# Patient Record
Sex: MALE | Race: WHITE | NOT HISPANIC OR LATINO | URBAN - METROPOLITAN AREA
[De-identification: names, ages, dates, MRNs, and addresses within clinical notes are randomized per-mention and may not be internally consistent; named-entity substitution may affect disease eponyms.]

---

## 2017-02-20 ENCOUNTER — HOSPITAL ENCOUNTER (OUTPATIENT)
Dept: PREOP | Facility: HOSPITAL | Age: 52
Setting detail: HOSPITAL OUTPATIENT SURGERY
Discharge: HOME | End: 2017-02-20
Attending: INTERNAL MEDICINE | Admitting: INTERNAL MEDICINE

## 2017-02-20 ENCOUNTER — ON CAMPUS - OUTPATIENT (OUTPATIENT)
Dept: URBAN - METROPOLITAN AREA HOSPITAL 85 | Facility: HOSPITAL | Age: 52
End: 2017-02-20
Payer: COMMERCIAL

## 2017-02-20 DIAGNOSIS — K64.9 UNSPECIFIED HEMORRHOIDS: ICD-10-CM

## 2017-02-20 DIAGNOSIS — K57.30 DIVERTICULOSIS OF LARGE INTESTINE WITHOUT PERFORATION OR ABS: ICD-10-CM

## 2017-02-20 DIAGNOSIS — Z12.11 ENCOUNTER FOR SCREENING FOR MALIGNANT NEOPLASM OF COLON: ICD-10-CM

## 2017-02-20 PROCEDURE — 45378 DIAGNOSTIC COLONOSCOPY: CPT | Performed by: INTERNAL MEDICINE

## 2022-06-16 ENCOUNTER — OFFICE VISIT (OUTPATIENT)
Dept: CARDIOLOGY | Facility: CLINIC | Age: 57
End: 2022-06-16

## 2022-06-16 VITALS
WEIGHT: 169 LBS | SYSTOLIC BLOOD PRESSURE: 142 MMHG | HEART RATE: 83 BPM | DIASTOLIC BLOOD PRESSURE: 93 MMHG | OXYGEN SATURATION: 98 % | BODY MASS INDEX: 26.53 KG/M2 | HEIGHT: 67 IN

## 2022-06-16 DIAGNOSIS — I10 ESSENTIAL HYPERTENSION: ICD-10-CM

## 2022-06-16 DIAGNOSIS — R07.9 RECURRENT CHEST PAIN: Primary | ICD-10-CM

## 2022-06-16 DIAGNOSIS — R06.09 DYSPNEA ON EXERTION: ICD-10-CM

## 2022-06-16 DIAGNOSIS — E78.5 DYSLIPIDEMIA: ICD-10-CM

## 2022-06-16 PROCEDURE — 99204 OFFICE O/P NEW MOD 45 MIN: CPT | Performed by: INTERNAL MEDICINE

## 2022-06-16 PROCEDURE — 93000 ELECTROCARDIOGRAM COMPLETE: CPT | Performed by: INTERNAL MEDICINE

## 2022-06-16 RX ORDER — ERGOCALCIFEROL 1.25 MG/1
50000 CAPSULE ORAL WEEKLY
COMMUNITY

## 2022-06-16 RX ORDER — LOSARTAN POTASSIUM 100 MG/1
100 TABLET ORAL DAILY
COMMUNITY
Start: 2022-06-11

## 2022-06-16 RX ORDER — METOPROLOL SUCCINATE 25 MG/1
25 TABLET, EXTENDED RELEASE ORAL DAILY
Qty: 90 TABLET | Refills: 3 | Status: SHIPPED | OUTPATIENT
Start: 2022-06-16

## 2022-06-16 RX ORDER — ASPIRIN 81 MG/1
81 TABLET ORAL DAILY
Qty: 90 TABLET | Refills: 3 | Status: SHIPPED | OUTPATIENT
Start: 2022-06-16

## 2022-06-16 RX ORDER — ATORVASTATIN CALCIUM 20 MG/1
TABLET, FILM COATED ORAL
COMMUNITY
Start: 2022-04-07

## 2022-06-16 NOTE — PROGRESS NOTES
Cardiology Consult Note    Patient Identification:  Name: Dusty Walker  Age: 56 y.o.  Sex: male  :  1965  MRN: [id number]             Requesting Physician :  Clement Bowman MD      Reason for Consultation / Chief Complaint : Chest pain    History of Present Illness:        Mr. Dusty Walker has PMH of    Hypertension  Dyslipidemia  Rotator cuff surgery  Former smoker  NKDA, family history negative for premature CAD,    Here for evaluation of chest pain.  Patient has been noticing substernal chest pain for last few months started in April.  No aggravating or relieving factors lasts almost all day.  Has shortness of breath.  Denies any dizziness loss of consciousness.      Patient's arterial blood pressure is 143/93, heart rate 83 bpm, O2 sat of 98% on room air.      Assessment:  :    Recurrent chest pain  Dyspnea on exertion  Former smoker  Hypertension  Dyslipidemia      Recommendations / Plan:        Reviewed EKG results with patient.  We will start him on aspirin and beta-blockers and continue statin.  We will schedule for stress Cardiolite.  Risk benefits alternatives explained.  We will follow-up after stress test and consider further evaluation treatment.           Diagnosis Plan   1. Recurrent chest pain  Stress Test With Myocardial Perfusion One Day   2. Dyspnea on exertion  Stress Test With Myocardial Perfusion One Day   3. Essential hypertension  Stress Test With Myocardial Perfusion One Day   4. Dyslipidemia  Stress Test With Myocardial Perfusion One Day              Past Medical History:  Past Medical History:   Diagnosis Date   • Hyperlipidemia    • Hypertension    • Vitamin D deficiency      Past Surgical History:  Past Surgical History:   Procedure Laterality Date   • SHOULDER SURGERY        Allergies:  No Known Allergies  Home Meds:  Current Meds:     Current Outpatient Medications:   •  atorvastatin (LIPITOR) 20 MG tablet, , Disp: , Rfl:   •  losartan (COZAAR) 100 MG  "tablet, Take 100 mg by mouth Daily., Disp: , Rfl:   •  vitamin D (ERGOCALCIFEROL) 1.25 MG (22309 UT) capsule capsule, Take 50,000 Units by mouth 1 (One) Time Per Week., Disp: , Rfl:   •  aspirin (aspirin) 81 MG EC tablet, Take 1 tablet by mouth Daily., Disp: 90 tablet, Rfl: 3  •  metoprolol succinate XL (TOPROL-XL) 25 MG 24 hr tablet, Take 1 tablet by mouth Daily., Disp: 90 tablet, Rfl: 3  Social History:   Social History     Tobacco Use   • Smoking status: Former Smoker     Years: 5.00     Types: Cigarettes   • Smokeless tobacco: Never Used   Substance Use Topics   • Alcohol use: Yes     Alcohol/week: 35.0 standard drinks     Types: 35 Cans of beer per week      Family History:  Family History   Problem Relation Age of Onset   • No Known Problems Mother    • No Known Problems Father         Review of Systems : Review of Systems   Constitutional: Negative for fever and malaise/fatigue.   HENT: Negative for congestion and hearing loss.    Eyes: Positive for visual disturbance. Negative for double vision.   Cardiovascular: Positive for chest pain and palpitations. Negative for claudication, dyspnea on exertion, leg swelling and syncope.   Respiratory: Positive for shortness of breath. Negative for cough.    Endocrine: Negative for cold intolerance.   Skin: Negative for color change and rash.   Musculoskeletal: Negative for arthritis and joint pain.   Gastrointestinal: Negative for abdominal pain and heartburn.   Genitourinary: Negative for hematuria.   Neurological: Negative for excessive daytime sleepiness and dizziness.   Psychiatric/Behavioral: Negative for depression. The patient is nervous/anxious.    All other systems reviewed and are negative.            Constitutional:  Heart Rate:  [83] 83  BP: (142)/(93) 142/93    Physical Exam   /93   Pulse 83   Ht 170.2 cm (67\")   Wt 76.7 kg (169 lb)   SpO2 98%   BMI 26.47 kg/m²   Physical Exam  General:  Appears in no acute distress  Eyes: Sclera is anicteric,  " conjunctiva is clear   HEENT:  No JVD. Thyroid not visibly enlarged. No mucosal pallor or cyanosis  Respiratory: Respirations regular and unlabored at rest.  Bilaterally good breath sounds, with good air entry in all fields. No crackles, rubs or wheezes auscultated  Cardiovascular: S1,S2 Regular rate and rhythm. No murmur, rub or gallop auscultated. No pretibial pitting edema  Gastrointestinal: Abdomen soft, flat, non tender. Bowel sounds present.   Musculoskeletal:  No abnormal movements  Extremities: No digital clubbing or cyanosis  Skin: Color pink. Skin warm and dry to touch. No rashes  No xanthoma  Neuro: Alert and awake, no lateralizing deficits appreciated    Cardiographics  ECG: EKG tracing was  personally reviewed/interpreted by me    ECG 12 Lead    Date/Time: 6/16/2022 2:19 PM  Performed by: Brice Beard MD  Authorized by: Brice Beard MD   Comparison: compared with previous ECG from 7/2/2018  Comparison to previous ECG: EKG done today reviewed/interpreted by me reveals sinus rhythm with rate of 79 bpm, no new change compared EKG from 7-18              Imaging  Chest X-ray:   Imaging Results (Last 24 Hours)     ** No results found for the last 24 hours. **          Lab Review: I have reviewed the labs              @[email or web address]@                  Brice Beard MD  6/16/2022, 14:20 EDT      EMR Dragon/Transcription:   Dictated utilizing Dragon dictation

## 2022-06-30 ENCOUNTER — APPOINTMENT (OUTPATIENT)
Dept: CARDIOLOGY | Facility: HOSPITAL | Age: 57
End: 2022-06-30

## 2022-07-08 ENCOUNTER — APPOINTMENT (OUTPATIENT)
Dept: CARDIOLOGY | Facility: HOSPITAL | Age: 57
End: 2022-07-08

## 2024-02-27 ENCOUNTER — OFFICE VISIT (OUTPATIENT)
Dept: CARDIOLOGY | Facility: CLINIC | Age: 59
End: 2024-02-27
Payer: COMMERCIAL

## 2024-02-27 VITALS
BODY MASS INDEX: 28.25 KG/M2 | HEIGHT: 67 IN | WEIGHT: 180 LBS | DIASTOLIC BLOOD PRESSURE: 88 MMHG | SYSTOLIC BLOOD PRESSURE: 145 MMHG | HEART RATE: 79 BPM | OXYGEN SATURATION: 100 %

## 2024-02-27 DIAGNOSIS — E78.2 MIXED DYSLIPIDEMIA: ICD-10-CM

## 2024-02-27 DIAGNOSIS — I10 ESSENTIAL HYPERTENSION: Primary | ICD-10-CM

## 2024-02-27 DIAGNOSIS — R60.0 BILATERAL LEG EDEMA: ICD-10-CM

## 2024-02-27 PROCEDURE — 99214 OFFICE O/P EST MOD 30 MIN: CPT | Performed by: NURSE PRACTITIONER

## 2024-02-27 PROCEDURE — 93000 ELECTROCARDIOGRAM COMPLETE: CPT | Performed by: NURSE PRACTITIONER

## 2024-02-27 RX ORDER — LOSARTAN POTASSIUM AND HYDROCHLOROTHIAZIDE 25; 100 MG/1; MG/1
1 TABLET ORAL DAILY
COMMUNITY

## 2024-02-27 RX ORDER — METOPROLOL SUCCINATE 25 MG/1
25 TABLET, EXTENDED RELEASE ORAL DAILY
Qty: 30 TABLET | Refills: 5 | Status: SHIPPED | OUTPATIENT
Start: 2024-02-27

## 2024-02-27 RX ORDER — CLONIDINE HYDROCHLORIDE 0.1 MG/1
1 TABLET ORAL DAILY
COMMUNITY

## 2024-02-27 RX ORDER — SIMVASTATIN 20 MG
20 TABLET ORAL NIGHTLY
COMMUNITY

## 2024-02-27 NOTE — PROGRESS NOTES
[facility] CARDIOLOGY      REASON FOR FOLLOW-UP:  Establish care  Hypertension          Chief Complaint   Patient presents with    Hypertension         Dear Clement Bowman MD        Hypertension       It was my pleasure to see Dusty Walker in the office today.  He is a 58-year-old male with no known history of ischemic heart disease and no family history of premature coronary disease.  Past medical history includes hypertension, dyslipidemia.  The patient was evaluated by cardiologist Dr. Beard in 2022 for chest pain.  He was started on aspirin, beta-blocker.  Statin was continued.  Myocardial perfusion study was ordered but it appears that was not completed.  Stress testing was performed July 2018 in which the patient exercised for 9 minutes 26 seconds via [place] protocol with target heart rate achieved and appropriate blood pressure elevation.  No evidence of provokable ischemia with EF 67%.    Mr. Walker is referred to our office today for further evaluation of hypertension.  The patient states he does not monitor his pressures routinely morning and night.  He has noticed fluctuations with highs in the 150s/160s systolic.  His blood pressure yesterday at home was 152/96.  1 week ago 128/72.  He has been taking Hyzaar in the evening and noted frequent nighttime urination.  He denies any complaints of chest pain, pressure, tightness.  He denies any shortness of breath at rest, dyspnea with exertion, orthopnea or PND.  He does report occasional mild lower extremity edema as well as some pain in his feet.  He has had some recent abdominal discomfort described as burning which he has reported to his primary care.  He denies any dizziness, lightheadedness, near syncopal or syncopal episodes.  EKG in the office today shows normal sinus rhythm with some nonspecific ST changes that are unchanged from previous.        Labs reviewed from 9/7/2023: A1c 5.6, BMP/CBC unremarkable.   Lipids with triglycerides 180, , thyroid studies within normal limits.      ASSESSMENT:  Primary hypertension-labile  Dyslipidemia  Lower extremity edema    PLAN:  Continue Hyzaar-take in the morning  Hold clonidine  Start metoprolol succinate 25 mg daily  Continue monitoring a.m./p.m. blood pressures and bring log to follow-up  Check 2D echocardiogram      Diagnoses and all orders for this visit:    1. Essential hypertension (Primary)  -     Adult Transthoracic Echo Complete w/ Color, Spectral and Contrast if necessary per protocol; Future    2. Bilateral leg edema  -     Adult Transthoracic Echo Complete w/ Color, Spectral and Contrast if necessary per protocol; Future    Other orders  -     metoprolol succinate XL (TOPROL-XL) 25 MG 24 hr tablet; Take 1 tablet by mouth Daily.  Dispense: 30 tablet; Refill: 5          The following portions of the patient's history were reviewed and updated as appropriate: allergies, current medications, past family history, past medical history, past social history, past surgical history, and problem list.    REVIEW OF SYSTEMS:    Review of Systems   Cardiovascular:  Positive for leg swelling.   All other systems reviewed and are negative.      Vitals:    02/27/24 0806   BP: 145/88   Pulse: 79   SpO2: 100%         PHYSICAL EXAM:    General: Alert, cooperative, no distress, appears stated age  Head:  Normocephalic, atraumatic, mucous membranes moist  Eyes:  Conjunctiva/corneas clear, EOM's intact     Neck:  Supple,  no JVD or bruit     Lungs: Clear to auscultation bilaterally, no wheezes rhonchi rales are noted  Chest wall: No tenderness  Musculoskeletal:   Ambulates freely without assistance  Heart::  Regular rate and rhythm, S1 and S2 normal, no murmur, rub or gallop  Abdomen: Soft, non-tender, nondistended, bowel sounds active, no abdominal bruit  Extremities: No cyanosis, clubbing, or edema   Pulses: 2+ and symmetric all extremities  Skin:  No rashes or  "lesions  Neuro/psych: A&O x3. CN II through XII are grossly intact with appropriate affect        Past Medical History:   Diagnosis Date    Hyperlipidemia     Hypertension     Vitamin D deficiency        Past Surgical History:   Procedure Laterality Date    SHOULDER SURGERY           Current Outpatient Medications:     cloNIDine (CATAPRES) 0.1 MG tablet, Take 1 tablet by mouth Daily., Disp: , Rfl:     losartan-hydrochlorothiazide (HYZAAR) 100-25 MG per tablet, Take 1 tablet by mouth Daily., Disp: , Rfl:     metoprolol succinate XL (TOPROL-XL) 25 MG 24 hr tablet, Take 1 tablet by mouth Daily., Disp: 30 tablet, Rfl: 5    simvastatin (ZOCOR) 20 MG tablet, Take 1 tablet by mouth Every Night., Disp: , Rfl:     vitamin D (ERGOCALCIFEROL) 1.25 MG (11815 UT) capsule capsule, Take 1 capsule by mouth 1 (One) Time Per Week., Disp: , Rfl:     aspirin (aspirin) 81 MG EC tablet, Take 1 tablet by mouth Daily., Disp: 90 tablet, Rfl: 3    atorvastatin (LIPITOR) 20 MG tablet, , Disp: , Rfl:     losartan (COZAAR) 100 MG tablet, Take 1 tablet by mouth Daily., Disp: , Rfl:     No Known Allergies    Family History   Problem Relation Age of Onset    No Known Problems Mother     No Known Problems Father        Social History     Tobacco Use    Smoking status: Former     Years: 5     Types: Cigarettes    Smokeless tobacco: Never   Substance Use Topics    Alcohol use: Yes     Alcohol/week: 35.0 standard drinks of alcohol     Types: 35 Cans of beer per week           Current Electrocardiogram:    ECG 12 Lead    Date/Time: 2/27/2024 8:35 AM  Performed by: Stephanie Zee APRN    Authorized by: Stephanie Zee APRN  Comparison: compared with previous ECG from 6/6/2022  Similar to previous ECG  Rhythm: sinus rhythm  BPM: 79  Other findings: non-specific ST-T wave changes              EMR Dragon/Transcription:   \"Dictated utilizing Dragon dictation\".     Copied text in this note has been reviewed by me and is accurate as of " 02/27/24.

## 2024-02-27 NOTE — PATIENT INSTRUCTIONS
Stop Clonidine    Continue Losartan/HCTZ    Start Metoprolol Succinate 25mg once daily    Write down blood pressures morning and night

## 2024-05-01 ENCOUNTER — TELEPHONE (OUTPATIENT)
Dept: CARDIOLOGY | Facility: CLINIC | Age: 59
End: 2024-05-01

## 2024-05-01 NOTE — TELEPHONE ENCOUNTER
Caller: Dusty Walker    Relationship: Self    Best call back number:  [phone]    PATIENT CALLED IN TO STATE THAT INS NEEDS MORE INFO IN ORDER TO APPROVE ECHO    [phone]

## 2024-05-15 ENCOUNTER — DOCUMENTATION (OUTPATIENT)
Dept: CARDIOLOGY | Facility: CLINIC | Age: 59
End: 2024-05-15
Payer: COMMERCIAL

## 2024-05-15 NOTE — PROGRESS NOTES
"Called for [id number] to review case for 2D echocardiogram.  Waited on hold several minutes for physician who said case must be reviewed by cardiologist.  Waited on hold again for cardiologist to answer.  Waited on hold several minutes for cardiologist to review office notes.  Cardiologist then stated that no edema was noted on physical exam and \"EKG is normal\".  I stated \"EKG shows non-specific...\".  I was interrupted by physician stating, \"No, it's normal.  Thanks for the call, Juliana.  Take care now\".  Physician hung up.  "

## 2024-05-23 ENCOUNTER — OFFICE VISIT (OUTPATIENT)
Dept: CARDIOLOGY | Facility: CLINIC | Age: 59
End: 2024-05-23
Payer: COMMERCIAL

## 2024-05-23 VITALS
WEIGHT: 175 LBS | HEIGHT: 67 IN | DIASTOLIC BLOOD PRESSURE: 93 MMHG | OXYGEN SATURATION: 99 % | BODY MASS INDEX: 27.47 KG/M2 | SYSTOLIC BLOOD PRESSURE: 170 MMHG | HEART RATE: 79 BPM

## 2024-05-23 DIAGNOSIS — R07.9 CHEST PAIN IN ADULT: Primary | ICD-10-CM

## 2024-05-23 DIAGNOSIS — R60.0 BILATERAL LEG EDEMA: ICD-10-CM

## 2024-05-23 DIAGNOSIS — E78.2 MIXED DYSLIPIDEMIA: ICD-10-CM

## 2024-05-23 DIAGNOSIS — R06.09 DYSPNEA ON EXERTION: ICD-10-CM

## 2024-05-23 DIAGNOSIS — I10 ESSENTIAL (PRIMARY) HYPERTENSION: ICD-10-CM

## 2024-05-23 PROCEDURE — 99214 OFFICE O/P EST MOD 30 MIN: CPT | Performed by: NURSE PRACTITIONER

## 2024-05-23 RX ORDER — MONTELUKAST SODIUM 10 MG/1
1 TABLET ORAL DAILY
COMMUNITY
Start: 2024-05-16

## 2024-05-23 RX ORDER — ALLOPURINOL 300 MG/1
300 TABLET ORAL DAILY
COMMUNITY

## 2024-05-23 NOTE — PROGRESS NOTES
[facility] CARDIOLOGY      REASON FOR FOLLOW-UP:  Chest pain  Dyspnea on exertion          Chief Complaint   Patient presents with    Heart Problem     SOA. Pain left neck and chest.         Dear Colby, Clement Bryant MD        History of Present Illness   It was my pleasure to see Dusty Walker in the office today.  He is a 58-year-old male with no known history of ischemic heart disease and no family history of premature coronary disease.  Past medical history includes hypertension, dyslipidemia.  The patient was evaluated by cardiologist Dr. Beard in 2022 for chest pain.  He was started on aspirin, beta-blocker.  Statin was continued.  Myocardial perfusion study was ordered but it appears that was not completed.  Stress testing was performed July 2018 in which the patient exercised for 9 minutes 26 seconds via [place] protocol with target heart rate achieved and appropriate blood pressure elevation.  No evidence of provokable ischemia with EF 67%.  2D echocardiogram was ordered but unable to be completed.  He presents today in follow-up for the above diagnoses.    Today, the patient tells me that he has been experiencing pain in his left chest and left neck area he describes it as a sharp pain, moderate in intensity that can occur with or without activity.  He reports shortness of breath with any activity.  His chest discomfort is not necessarily associated with shortness of breath.  He denies any dizziness, lightheadedness, near syncopal or syncopal episodes.  No diaphoresis, nausea or vomiting.  He presents today a blood pressure log that shows elevated pressures in the morning just prior to taking his antihypertensive.  Other blood pressures are within good readings.  He has had intermittent mild lower extremity edema.      ASSESSMENT:  Chest discomfort  Dyspnea on exertion  Primary hypertension  Dyslipidemia  Lower extremity edema    PLAN:  Patient should hold clonidine.   Continue Hyzaar in the morning, take Toprol in the evening.  I will resend order for 2D echocardiogram to evaluate for any LV or valvular dysfunction contributing to his chest discomfort.  He did have exercise nuclear stress test in 2022 with good functional capacity and no ECG changes during exercise.  No evidence of inducible ischemia.  Follow-up after testing        CHF Guideline Directed Medical Therapy  Beta Blocker:   ARNI/ACE/ARB:   SGLT 2 inhibitors:   Diuretics:   Aldosterone Antagonist:   Vasodilators & Nitrates:       Diagnoses and all orders for this visit:    1. Chest pain in adult (Primary)    2. Dyspnea on exertion    3. Essential (primary) hypertension    4. Mixed dyslipidemia    5. Bilateral leg edema          The following portions of the patient's history were reviewed and updated as appropriate: allergies, current medications, past family history, past medical history, past social history, past surgical history, and problem list.    REVIEW OF SYSTEMS:    Review of Systems   Cardiovascular:  Positive for chest pain, dyspnea on exertion and leg swelling.   Respiratory:  Positive for shortness of breath.    All other systems reviewed and are negative.      Vitals:    05/23/24 1007   BP: 170/93   Pulse: 79   SpO2: 99%         PHYSICAL EXAM:    General: Alert, cooperative, no distress, appears stated age  Head:  Normocephalic, atraumatic, mucous membranes moist  Eyes:  Conjunctiva/corneas clear, EOM's intact     Neck:  Supple,  no JVD or bruit     Lungs: Clear to auscultation bilaterally, no wheezes rhonchi rales are noted  Chest wall: No tenderness  Musculoskeletal:   Ambulates freely without assistance  Heart::  Regular rate and rhythm, S1 and S2 normal, no murmur, rub or gallop  Abdomen: Soft, non-tender, nondistended, bowel sounds active, no abdominal bruit  Extremities: No cyanosis, clubbing, or edema   Pulses: 2+ and symmetric all extremities  Skin:  No rashes or lesions  Neuro/psych: A&O x3. CN  "II through XII are grossly intact with appropriate affect        Past Medical History:   Diagnosis Date    Hyperlipidemia     Hypertension     Vitamin D deficiency        Past Surgical History:   Procedure Laterality Date    SHOULDER SURGERY           Current Outpatient Medications:     allopurinol (ZYLOPRIM) 300 MG tablet, Take 1 tablet by mouth Daily., Disp: , Rfl:     losartan-hydrochlorothiazide (HYZAAR) 100-25 MG per tablet, Take 1 tablet by mouth Daily., Disp: , Rfl:     metoprolol succinate XL (TOPROL-XL) 25 MG 24 hr tablet, Take 1 tablet by mouth Daily., Disp: 30 tablet, Rfl: 5    montelukast (SINGULAIR) 10 MG tablet, Take 1 tablet by mouth Daily., Disp: , Rfl:     simvastatin (ZOCOR) 20 MG tablet, Take 1 tablet by mouth Every Night., Disp: , Rfl:     vitamin D (ERGOCALCIFEROL) 1.25 MG (16704 UT) capsule capsule, Take 1 capsule by mouth 1 (One) Time Per Week., Disp: , Rfl:     aspirin (aspirin) 81 MG EC tablet, Take 1 tablet by mouth Daily. (Patient not taking: Reported on 5/23/2024), Disp: 90 tablet, Rfl: 3    cloNIDine (CATAPRES) 0.1 MG tablet, Take 1 tablet by mouth Daily. (Patient not taking: Reported on 5/23/2024), Disp: , Rfl:     No Known Allergies    Family History   Problem Relation Age of Onset    No Known Problems Mother     No Known Problems Father        Social History     Tobacco Use    Smoking status: Former     Types: Cigarettes    Smokeless tobacco: Never   Substance Use Topics    Alcohol use: Yes     Alcohol/week: 35.0 standard drinks of alcohol     Types: 35 Cans of beer per week           Current Electrocardiogram:  Procedures        EMR Dragon/Transcription:   \"Dictated utilizing Dragon dictation\".     Copied text in this note has been reviewed by me and is accurate as of 05/24/24.    "

## 2025-04-08 NOTE — PROGRESS NOTES
"Chief Complaint  Establish Care, Hypertension, and Diabetes    Subjective          Dusty Walker presents to [facility] for     HPI  Establish care, former patient of Clement Bowman. Patient is a native Vietnamese speaker and HPI was obtained with the help of a professional .    Hypertension  Patient does check blood pressure at home.   Home readings range from  120's/70's to 190's/90's per patient/patient submitted blood pressure diary.  He says that more often than not his systolic is 150s to 160s when he check it in the morning.  However, this is at 7 am before he takes his antihypertensives.  Following taking his blood pressure medications, a few hours later, his systolic is significantly lower and typically in the 120s-130s.  Patient denies chest pain, blurred vision, dyspnea, headache, and palpitations.   Patient reports they are taking medications as prescribed and they are not having side effects.  Reports that his blood pressure runs higher in the AM than in the evening.     Patient states that he used to take simvastatin but that his cholesterol is \"good\" and he \"doesn't need it anymore.\"    Lower abdominal pain present for atleast 1 year.   He points to the suprapubic region.  Has hx of BPH, takes Flomax.  Mostly hurts in the mornings.  Feels he is producing less ejaculate when he orgasms over the past year.  He says this has been evaluated several times previously.  He states that he saw a urologist, had abdominal ultrasound, was scheduled for cystoscopy but never proceeded with this.  Has issues with voiding, hesitancy, dribbling, weakened urinary stream. Does not believe that Flomax has helped.    Most recent available labs from 9/2023:  Uric acid 5.7  A1c 5.6 (patient shows more recent A1c of 6.1)  CMP unremarkable  Lipid panel - total cholesterol 172, hdl 39, , ldl 103  B12 367  TSH, fT3, fT4 WNL  PSA 0.56  Vitamin D 72  CBC " "unremarkable    Objective   Vital Signs:   /82 (BP Location: Right arm, Patient Position: Sitting, Cuff Size: Adult)   Pulse 84   Temp 98.4 °F (36.9 °C) (Temporal)   Resp 18   Ht 171.5 cm (67.5\")   Wt 81.8 kg (180 lb 6 oz)   SpO2 99% Comment: room air  BMI 27.83 kg/m²     Physical Exam  Vitals and nursing note reviewed.   Constitutional:       General: He is not in acute distress.     Appearance: Normal appearance. He is not ill-appearing or diaphoretic.   HENT:      Head: Normocephalic and atraumatic.      Nose: No congestion or rhinorrhea.   Eyes:      Extraocular Movements: Extraocular movements intact.      Pupils: Pupils are equal, round, and reactive to light.   Cardiovascular:      Rate and Rhythm: Normal rate and regular rhythm.      Pulses: Normal pulses.   Pulmonary:      Effort: Pulmonary effort is normal.      Breath sounds: Normal breath sounds.   Abdominal:      Tenderness:  in the right upper quadrant and suprapubic area There is no guarding or rebound. Positive signs include Aguiar's sign. Negative signs include Rovsing's sign and McBurney's sign.   Musculoskeletal:         General: Normal range of motion.      Cervical back: Normal range of motion.   Skin:     General: Skin is warm and dry.   Neurological:      Mental Status: He is alert and oriented to person, place, and time.   Psychiatric:         Mood and Affect: Mood normal.         Behavior: Behavior normal.        Result Review :                 Assessment and Plan    Diagnoses and all orders for this visit:    1. Primary hypertension (Primary)  Assessment & Plan:  Patient's current regimen includes losartan, hydrochlorothiazide, and spironolactone at a relatively high dose.  Discussed possibility of initiating a secondary hypertension evaluation.  Patient states that he is already had this done.  We will obtain his prior records to confirm.  While he is having elevated blood pressure measurements, these are exclusively in the " morning prior to taking any of his antihypertensives.  Shortly after taking his medications, his blood pressure is more or less at target.  There are number of other blood pressure medications listed on his previous med rec including clonidine and metoprolol; unclear why he is no longer taking these.  Again, we will review his prior records as soon as they are available.  We will follow-up with him in a couple weeks once we have his lab results and prior records.    Orders:  -     Comprehensive Metabolic Panel  -     TSH Rfx On Abnormal To Free T4  -     CBC (No Diff)    2. Mixed hyperlipidemia  Assessment & Plan:  Unclear why he is off his simvastatin.  We will recheck a lipid panel and review prior records once they are available.    Orders:  -     Lipid Panel    3. Chronic gout without tophus, unspecified cause, unspecified site  Overview:  Regimen: Allopurinol 300 mg qd.    Orders:  -     Uric Acid    4. Benign prostatic hyperplasia with weak urinary stream  Assessment & Plan:  He was reportedly in the midst of evaluation with urology previously for his urinary, bladder, prostate, and ejaculation issues.  He states that he was scheduled for cystoscopy.  He says that he is no longer following with that urologist and is requesting referral to a new one.  He does not know who the previous urologist was.  As above, we will obtain prior PCP records and review.  In the meantime, we will check urine as below and get the ball rolling on a new urology referral.    Orders:  -     Ambulatory Referral to Urology    5. Bladder pain  -     Urinalysis With Microscopic - Urine, Random Void  -     Ambulatory Referral to Urology  -     Urine Culture - Urine, Urine, Random Void    6. RUQ pain  Comments:  This was an unexpected physical exam finding. Aside from TTP and positive Aguiar on exam, he is not endorsing any RUQ pain, nausea, fever, etc. Eval w/ RUQ US.  Orders:  -     US Gallbladder; Future         Follow Up   Return in  about 1 week (around 4/16/2025).      Stalin Schmitt MD    NOTE: [facility], per Health Information accessibility laws, allows progress notes to be visible to patients. Please note that these notes will include professional medical terminology that may be somewhat confusing without some interpretation from your medical professional team. The intent of progress notes is to communicate information between any medical professionals involved in your case, or to serve as future reference for myself or any other provider when reviewing your case, as well as a reference for the patient viewing the record. Please ask a member of the medical team if you have any questions about terminology or content of note.    DISCLAIMER: Part of this note may be an electronic transcription/translation of spoken language to printed text using the Dragon Dictation System.

## 2025-04-09 ENCOUNTER — OFFICE VISIT (OUTPATIENT)
Dept: FAMILY MEDICINE CLINIC | Facility: CLINIC | Age: 60
End: 2025-04-09
Payer: COMMERCIAL

## 2025-04-09 VITALS
OXYGEN SATURATION: 99 % | DIASTOLIC BLOOD PRESSURE: 82 MMHG | HEIGHT: 68 IN | WEIGHT: 180.38 LBS | RESPIRATION RATE: 18 BRPM | HEART RATE: 84 BPM | SYSTOLIC BLOOD PRESSURE: 144 MMHG | BODY MASS INDEX: 27.34 KG/M2 | TEMPERATURE: 98.4 F

## 2025-04-09 DIAGNOSIS — I10 PRIMARY HYPERTENSION: Primary | ICD-10-CM

## 2025-04-09 DIAGNOSIS — E78.2 MIXED HYPERLIPIDEMIA: ICD-10-CM

## 2025-04-09 DIAGNOSIS — R39.89 BLADDER PAIN: ICD-10-CM

## 2025-04-09 DIAGNOSIS — M1A.9XX0 CHRONIC GOUT WITHOUT TOPHUS, UNSPECIFIED CAUSE, UNSPECIFIED SITE: ICD-10-CM

## 2025-04-09 DIAGNOSIS — R10.11 RUQ PAIN: ICD-10-CM

## 2025-04-09 DIAGNOSIS — N40.1 BENIGN PROSTATIC HYPERPLASIA WITH WEAK URINARY STREAM: ICD-10-CM

## 2025-04-09 DIAGNOSIS — R39.12 BENIGN PROSTATIC HYPERPLASIA WITH WEAK URINARY STREAM: ICD-10-CM

## 2025-04-09 PROBLEM — R73.03 PREDIABETES: Status: ACTIVE | Noted: 2025-04-09

## 2025-04-09 PROCEDURE — 99204 OFFICE O/P NEW MOD 45 MIN: CPT

## 2025-04-09 RX ORDER — SPIRONOLACTONE 100 MG/1
100 TABLET, FILM COATED ORAL DAILY
COMMUNITY

## 2025-04-09 RX ORDER — FLUCONAZOLE 100 MG/1
100 TABLET ORAL DAILY
COMMUNITY

## 2025-04-09 RX ORDER — TAMSULOSIN HYDROCHLORIDE 0.4 MG/1
1 CAPSULE ORAL DAILY
COMMUNITY

## 2025-04-09 NOTE — ASSESSMENT & PLAN NOTE
He was reportedly in the midst of evaluation with urology previously for his urinary, bladder, prostate, and ejaculation issues.  He states that he was scheduled for cystoscopy.  He says that he is no longer following with that urologist and is requesting referral to a new one.  He does not know who the previous urologist was.  As above, we will obtain prior PCP records and review.  In the meantime, we will check urine as below and get the ball rolling on a new urology referral.

## 2025-04-09 NOTE — ASSESSMENT & PLAN NOTE
Patient's current regimen includes losartan, hydrochlorothiazide, and spironolactone at a relatively high dose.  Discussed possibility of initiating a secondary hypertension evaluation.  Patient states that he is already had this done.  We will obtain his prior records to confirm.  While he is having elevated blood pressure measurements, these are exclusively in the morning prior to taking any of his antihypertensives.  Shortly after taking his medications, his blood pressure is more or less at target.  There are number of other blood pressure medications listed on his previous med rec including clonidine and metoprolol; unclear why he is no longer taking these.  Again, we will review his prior records as soon as they are available.  We will follow-up with him in a couple weeks once we have his lab results and prior records.

## 2025-04-09 NOTE — ASSESSMENT & PLAN NOTE
Unclear why he is off his simvastatin.  We will recheck a lipid panel and review prior records once they are available.

## 2025-04-10 ENCOUNTER — PATIENT ROUNDING (BHMG ONLY) (OUTPATIENT)
Dept: FAMILY MEDICINE CLINIC | Facility: CLINIC | Age: 60
End: 2025-04-10
Payer: COMMERCIAL

## 2025-04-10 LAB
ALBUMIN SERPL-MCNC: 4.6 G/DL (ref 3.8–4.9)
ALP SERPL-CCNC: 104 IU/L (ref 44–121)
ALT SERPL-CCNC: 64 IU/L (ref 0–44)
APPEARANCE UR: CLEAR
AST SERPL-CCNC: 54 IU/L (ref 0–40)
BACTERIA #/AREA URNS HPF: ABNORMAL /[HPF]
BILIRUB SERPL-MCNC: 0.5 MG/DL (ref 0–1.2)
BILIRUB UR QL STRIP: NEGATIVE
BUN SERPL-MCNC: 15 MG/DL (ref 6–24)
BUN/CREAT SERPL: 20 (ref 9–20)
CALCIUM SERPL-MCNC: 9.6 MG/DL (ref 8.7–10.2)
CASTS URNS QL MICRO: ABNORMAL /LPF
CHLORIDE SERPL-SCNC: 102 MMOL/L (ref 96–106)
CHOLEST SERPL-MCNC: 196 MG/DL (ref 100–199)
CO2 SERPL-SCNC: 19 MMOL/L (ref 20–29)
COLOR UR: YELLOW
CREAT SERPL-MCNC: 0.75 MG/DL (ref 0.76–1.27)
CRYSTALS URNS MICRO: ABNORMAL
EGFRCR SERPLBLD CKD-EPI 2021: 105 ML/MIN/1.73
EPI CELLS #/AREA URNS HPF: ABNORMAL /HPF (ref 0–10)
ERYTHROCYTE [DISTWIDTH] IN BLOOD BY AUTOMATED COUNT: 13.1 % (ref 11.6–15.4)
GLOBULIN SER CALC-MCNC: 2.2 G/DL (ref 1.5–4.5)
GLUCOSE SERPL-MCNC: 118 MG/DL (ref 70–99)
GLUCOSE UR QL STRIP: NEGATIVE
HCT VFR BLD AUTO: 47.9 % (ref 37.5–51)
HDLC SERPL-MCNC: 41 MG/DL
HGB BLD-MCNC: 15.9 G/DL (ref 13–17.7)
HGB UR QL STRIP: NEGATIVE
KETONES UR QL STRIP: ABNORMAL
LDLC SERPL CALC-MCNC: 119 MG/DL (ref 0–99)
LEUKOCYTE ESTERASE UR QL STRIP: NEGATIVE
MCH RBC QN AUTO: 29.3 PG (ref 26.6–33)
MCHC RBC AUTO-ENTMCNC: 33.2 G/DL (ref 31.5–35.7)
MCV RBC AUTO: 88 FL (ref 79–97)
MICRO URNS: ABNORMAL
MUCOUS THREADS URNS QL MICRO: PRESENT
NITRITE UR QL STRIP: NEGATIVE
PH UR STRIP: 6 [PH] (ref 5–7.5)
PLATELET # BLD AUTO: 298 X10E3/UL (ref 150–450)
POTASSIUM SERPL-SCNC: 4.4 MMOL/L (ref 3.5–5.2)
PROT SERPL-MCNC: 6.8 G/DL (ref 6–8.5)
PROT UR QL STRIP: ABNORMAL
RBC # BLD AUTO: 5.42 X10E6/UL (ref 4.14–5.8)
RBC #/AREA URNS HPF: ABNORMAL /HPF (ref 0–2)
SODIUM SERPL-SCNC: 139 MMOL/L (ref 134–144)
SP GR UR STRIP: >=1.03 (ref 1–1.03)
TRIGL SERPL-MCNC: 204 MG/DL (ref 0–149)
TSH SERPL DL<=0.005 MIU/L-ACNC: 1.59 UIU/ML (ref 0.45–4.5)
UNIDENT CRYS URNS QL MICRO: PRESENT
URATE SERPL-MCNC: 5 MG/DL (ref 3.8–8.4)
UROBILINOGEN UR STRIP-MCNC: 0.2 MG/DL (ref 0.2–1)
VLDLC SERPL CALC-MCNC: 36 MG/DL (ref 5–40)
WBC # BLD AUTO: 6.1 X10E3/UL (ref 3.4–10.8)
WBC #/AREA URNS HPF: ABNORMAL /HPF (ref 0–5)

## 2025-04-10 NOTE — PROGRESS NOTES
"April 10, 2025    Hello, may I speak with Dusty Walker?    My name is Angelique    I am  with [facility] [address]  [facility]  [address] [place] [address] [address]  [place] [address].    Before we get started may I verify your date of birth? 1965    I am calling to officially welcome you to our practice and ask about your recent visit. Is this a good time to talk? yes    Tell me about your visit with us. What things went well?  'good, I liked the doctor and everyone in  the office\"       We're always looking for ways to make our patients' experiences even better. Do you have recommendations on ways we may improve?  no    Overall were you satisfied with your first visit to our practice? yes       I appreciate you taking the time to speak with me today. Is there anything else I can do for you? no      Thank you, and have a great day.      "

## 2025-04-11 LAB
BACTERIA UR CULT: NO GROWTH
BACTERIA UR CULT: NORMAL

## 2025-04-16 ENCOUNTER — HOSPITAL ENCOUNTER (OUTPATIENT)
Dept: ULTRASOUND IMAGING | Facility: HOSPITAL | Age: 60
Discharge: HOME | End: 2025-04-16
Payer: COMMERCIAL

## 2025-04-16 DIAGNOSIS — R10.11 RUQ PAIN: ICD-10-CM

## 2025-04-16 PROCEDURE — 76705 ECHO EXAM OF ABDOMEN: CPT

## 2025-05-06 NOTE — PROGRESS NOTES
"Chief Complaint  Hypertension, Hyperlipidemia, and Abdominal Pain    Subjective          Dusty Walker presents to [facility] for     HPI  Follow up on hypertension, and hyperlipidemia. A professional  was used to communicate with the patient.    Hypertension  Patient does check blood pressure at home.   Home readings range from  124/77 to 168/99 per patient/patient submitted blood pressure diary.  Patient denies blurred vision, dyspnea, headache, palpitations, and peripheral edema .  Patient states that, over the years, he has had intermittent chest pain. He does not have any currently. Most recent episode approximately 2 weeks ago. Negative stress test in 2018.  Patient reports they are taking medications as prescribed and they are not having side effects.    Hyperlipidemia  Patient is not following a low cholesterol diet.   Currently is not on statin therapy.  Patient reports is exercising.  Patient reports they are not taking medications as prescribed.     Bladder pain  Continued suprapubic pain. This has been ongoing for over 1 year.  No radiation, nausea, vomiting, fever, chills.  Had previously seen urology, was scheduled for cystoscopy but lost to follow up.  Urology referral made at his last appointment and when Dr. Flores's office called to scheduled he declined at that time.      Objective   Vital Signs:   /70 (BP Location: Right arm, Patient Position: Sitting, Cuff Size: Adult)   Pulse 78   Temp 98 °F (36.7 °C) (Temporal)   Resp 18   Ht 171.5 cm (67.5\")   Wt 82.1 kg (181 lb)   SpO2 98% Comment: room air  BMI 27.93 kg/m²     Physical Exam  Vitals and nursing note reviewed.   Constitutional:       General: He is not in acute distress.     Appearance: Normal appearance. He is not ill-appearing or diaphoretic.   HENT:      Head: Normocephalic and atraumatic.      Nose: No congestion or rhinorrhea.   Eyes:      Extraocular Movements: " Extraocular movements intact.      Pupils: Pupils are equal, round, and reactive to light.   Cardiovascular:      Rate and Rhythm: Normal rate and regular rhythm.      Pulses: Normal pulses.   Pulmonary:      Effort: Pulmonary effort is normal.      Breath sounds: Normal breath sounds.   Abdominal:      Tenderness:  in the suprapubic area There is no right CVA tenderness, left CVA tenderness, guarding or rebound.   Musculoskeletal:         General: Normal range of motion.      Cervical back: Normal range of motion.   Skin:     General: Skin is warm and dry.   Neurological:      Mental Status: He is alert and oriented to person, place, and time.   Psychiatric:         Mood and Affect: Mood normal.         Behavior: Behavior normal.        Result Review :                 Assessment and Plan    Diagnoses and all orders for this visit:    1. Primary hypertension (Primary)  Overview:  Regimen: Losartan-hctz 100-25 mg qd.    Assessment & Plan:  Patient with borderline control of his BP.  At home, it alternates between normal and high.  It is 138/70 in the office today.    On review of available medical documentation, it seems as though patient has been on multiple antihypertensives in the past which include spironolactone, metoprolol, clonidine, and Cardizem, with no clear rationale.    Discussed possibility of initiating secondary hypertension evaluation but patient states this was done previously and declines.    He does endorse a several year history of intermittent chest pain. He states that this has been evaluated recently but all we are able to see is a negative stress test for 2018.    Do feel that patient would benefit tremendously from seeing cardiology.  Fortunately, we were able to get him set up to establish with Dr. Valdes this morning.  He will proceed directly downstairs to cardiology for a consult.    Orders:  -     Ambulatory Referral to Cardiology    2. Chest pain, unspecified type  Assessment &  Plan:  This has been an intermittent issue for several years.  Negative stress test in 2018.  Most recent episode was approximately 2 weeks ago.  As above, he will establish with cardiology this morning for further evaluation.    Orders:  -     Ambulatory Referral to Cardiology    3. Mixed hyperlipidemia  Overview:  Lab Results   Component Value Date    CHLPL 196 04/09/2025    TRIG 204 (H) 04/09/2025    HDL 41 04/09/2025     (H) 04/09/2025     The 10-year ASCVD risk score (Singh SERRANO, et al., 2019) is: 11%    Values used to calculate the score:      Age: 59 years      Sex: Male      Is Non- : No      Diabetic: No      Tobacco smoker: No      Systolic Blood Pressure: 138 mmHg      Is BP treated: Yes      HDL Cholesterol: 41 mg/dL      Total Cholesterol: 196 mg/dL    Assessment & Plan:  Patient has been of his statin for unspecified reasons.  His cholesterol and 10-year ASCVD risk were discussed.  He is agreeable to get back on his statin.  We will restart simvastatin 20 mg qd.  Repeat lipid panel and CMP in 3 months.    Orders:  -     simvastatin (ZOCOR) 20 MG tablet; Take 1 tablet by mouth Every Night.  Dispense: 90 tablet; Refill: 3    4. Elevated liver transaminase level  Comments:  Mild elevations in 60s on recent CMP. RUQ ultrasound shows heptatic steatosis. Counseled on diet + weight loss. Statin may help as well. Repeat CMP in 3 months.    5. Chronic gout without tophus, unspecified cause, unspecified site  Overview:  Regimen: Allopurinol 300 mg qd.    Lab Results   Component Value Date    URICACID 5.0 04/09/2025       Assessment & Plan:  Uric acid is at target.  He is asymptomatic at this time.  Continue allopurinol at current dose.      6. Benign prostatic hyperplasia with weak urinary stream  Assessment & Plan:  He was reportedly in the midst of evaluation with urology previously for his urinary, bladder, prostate, and ejaculation issues.  At last visit, he states that he was  scheduled for cystoscopy and is no longer following with previous urologist and is requested referral to a new one.  He did not know who the previous urologist was.    Urinalysis was unremarkable aside from calcium oxalate crystals on micro. Urine culture was negative.    We got him referred to Dr. Flores downstairs.  Unfortunately, when he called to schedule him, he declined appointment.  Today, patient states that there was a misunderstanding.  He did not understand that they were from a new office and thought they were calling him from the prior office.  He is in fact agreeable to establish with Dr. Flores.  We did contact his office and get him scheduled for an appointment this month, which patient is agreeable to attend.      I spent 44 minutes caring for Dusty on this date of service. This time includes time spent by me in the following activities:preparing for the visit, reviewing tests, performing a medically appropriate examination and/or evaluation , counseling and educating the patient/family/caregiver, ordering medications, tests, or procedures, referring and communicating with other health care professionals , and documenting information in the medical record    Stalin Schmitt MD    NOTE: [facility], per Health Information accessibility laws, allows progress notes to be visible to patients. Please note that these notes will include professional medical terminology that may be somewhat confusing without some interpretation from your medical professional team. The intent of progress notes is to communicate information between any medical professionals involved in your case, or to serve as future reference for myself or any other provider when reviewing your case, as well as a reference for the patient viewing the record. Please ask a member of the medical team if you have any questions about terminology or content of note.    DISCLAIMER: Part of this note may be an electronic transcription/translation  of spoken language to printed text using the Dragon Dictation System.

## 2025-05-07 ENCOUNTER — TELEPHONE (OUTPATIENT)
Dept: FAMILY MEDICINE CLINIC | Facility: CLINIC | Age: 60
End: 2025-05-07

## 2025-05-07 ENCOUNTER — OFFICE VISIT (OUTPATIENT)
Dept: FAMILY MEDICINE CLINIC | Facility: CLINIC | Age: 60
End: 2025-05-07
Payer: COMMERCIAL

## 2025-05-07 ENCOUNTER — OFFICE VISIT (OUTPATIENT)
Dept: CARDIOLOGY | Facility: CLINIC | Age: 60
End: 2025-05-07
Payer: COMMERCIAL

## 2025-05-07 VITALS
WEIGHT: 178 LBS | DIASTOLIC BLOOD PRESSURE: 95 MMHG | SYSTOLIC BLOOD PRESSURE: 165 MMHG | BODY MASS INDEX: 27.94 KG/M2 | HEIGHT: 67 IN | HEART RATE: 66 BPM | RESPIRATION RATE: 18 BRPM | OXYGEN SATURATION: 98 %

## 2025-05-07 VITALS
HEIGHT: 68 IN | OXYGEN SATURATION: 98 % | SYSTOLIC BLOOD PRESSURE: 138 MMHG | DIASTOLIC BLOOD PRESSURE: 70 MMHG | BODY MASS INDEX: 27.43 KG/M2 | RESPIRATION RATE: 18 BRPM | HEART RATE: 78 BPM | TEMPERATURE: 98 F | WEIGHT: 181 LBS

## 2025-05-07 DIAGNOSIS — E78.2 MIXED HYPERLIPIDEMIA: ICD-10-CM

## 2025-05-07 DIAGNOSIS — I10 PRIMARY HYPERTENSION: Primary | ICD-10-CM

## 2025-05-07 DIAGNOSIS — N40.1 BENIGN PROSTATIC HYPERPLASIA WITH WEAK URINARY STREAM: ICD-10-CM

## 2025-05-07 DIAGNOSIS — R39.12 BENIGN PROSTATIC HYPERPLASIA WITH WEAK URINARY STREAM: ICD-10-CM

## 2025-05-07 DIAGNOSIS — R07.9 CHEST PAIN, UNSPECIFIED TYPE: ICD-10-CM

## 2025-05-07 DIAGNOSIS — M1A.9XX0 CHRONIC GOUT WITHOUT TOPHUS, UNSPECIFIED CAUSE, UNSPECIFIED SITE: ICD-10-CM

## 2025-05-07 DIAGNOSIS — R74.01 ELEVATED LIVER TRANSAMINASE LEVEL: ICD-10-CM

## 2025-05-07 DIAGNOSIS — R06.02 SHORTNESS OF BREATH: ICD-10-CM

## 2025-05-07 RX ORDER — HYDROCHLOROTHIAZIDE 25 MG/1
25 TABLET ORAL DAILY
Qty: 90 TABLET | Refills: 3 | Status: SHIPPED | OUTPATIENT
Start: 2025-05-07

## 2025-05-07 RX ORDER — SIMVASTATIN 20 MG
20 TABLET ORAL NIGHTLY
Qty: 90 TABLET | Refills: 3 | Status: SHIPPED | OUTPATIENT
Start: 2025-05-07

## 2025-05-07 RX ORDER — LOSARTAN POTASSIUM 100 MG/1
100 TABLET ORAL DAILY
COMMUNITY

## 2025-05-07 NOTE — ASSESSMENT & PLAN NOTE
Patient with borderline control of his BP.  At home, it alternates between normal and high.  It is 138/70 in the office today.    On review of available medical documentation, it seems as though patient has been on multiple antihypertensives in the past which include spironolactone, metoprolol, clonidine, and Cardizem, with no clear rationale.    Discussed possibility of initiating secondary hypertension evaluation but patient states this was done previously and declines.    He does endorse a several year history of intermittent chest pain. He states that this has been evaluated recently but all we are able to see is a negative stress test for 2018.    Do feel that patient would benefit tremendously from seeing cardiology.  Fortunately, we were able to get him set up to establish with Dr. Valdes this morning.  He will proceed directly downstairs to cardiology for a consult.

## 2025-05-07 NOTE — ASSESSMENT & PLAN NOTE
This has been an intermittent issue for several years.  Negative stress test in 2018.  Most recent episode was approximately 2 weeks ago.  As above, he will establish with cardiology this morning for further evaluation.

## 2025-05-07 NOTE — ASSESSMENT & PLAN NOTE
Patient has been of his statin for unspecified reasons.  His cholesterol and 10-year ASCVD risk were discussed.  He is agreeable to get back on his statin.  We will restart simvastatin 20 mg qd.  Repeat lipid panel and CMP in 3 months.

## 2025-05-07 NOTE — TELEPHONE ENCOUNTER
Patient was in the office today and was ordered a cardiology referral as well as we scheduled for urology his appointments are as follows :     Dr. Valdes cardiology   Today 5/7/2025 at 11:15   [address] [place] [id number]    Urology:   Friday May 23,2025 at 10:00 am to get ultrasound at [address] [place] [address] [place] [id number]    May 28,2025 with Dr. Rae to follow the ultrasound and see doctor at  [address] [place] [address] [place] [id number]

## 2025-05-07 NOTE — ASSESSMENT & PLAN NOTE
He was reportedly in the midst of evaluation with urology previously for his urinary, bladder, prostate, and ejaculation issues.  At last visit, he states that he was scheduled for cystoscopy and is no longer following with previous urologist and is requested referral to a new one.  He did not know who the previous urologist was.    Urinalysis was unremarkable aside from calcium oxalate crystals on micro. Urine culture was negative.    We got him referred to Dr. Flores downstairs.  Unfortunately, when he called to schedule him, he declined appointment.  Today, patient states that there was a misunderstanding.  He did not understand that they were from a new office and thought they were calling him from the prior office.  He is in fact agreeable to establish with Dr. Flores.  We did contact his office and get him scheduled for an appointment this month, which patient is agreeable to attend.

## 2025-05-07 NOTE — PROGRESS NOTES
Date of Office Visit: 2025  Encounter Provider: Dr. Teja Valdes  Place of Service: [facility] CARDIOLOGY [place]  Patient Name: Dusty Walker  :1965  Stalin Schmitt MD    Chief Complaint   Patient presents with    Chest Pain    Hypertension    Follow-up     History of Present Illness:    I am pleased to see Mr. Walker in my office today as a new consultation.    As you know, patient is a 59-year-old  gentleman whose past medical history significant for hypertension, hyperlipidemia, who is referred to me for symptom of shortness of breath and chest discomfort.    Interview with the patient happened through  via Zoom.    In 2018, patient underwent stress test in which he walked for total of 9 minutes and 26 seconds and no ischemia or myocardial infarction noted.  LVEF was 67%.    Patient reports that he is having symptom of shortness of breath with exertion.  Patient denies any symptom of orthopnea PND no leg edema noted.  Patient does complain of occasional chest heaviness.  Patient denies any syncope or presyncope.  No leg edema noted.    Patient smoked in the past but has quit smoking.  Patient drinks 3-4 beer    EKG showed sinus bradycardia.  No previous EKG to compare    I would recommend to proceed with CT calcium scores.  I would also recommend PFT.  I would consider echocardiogram in future.        Past Medical History:   Diagnosis Date    Hyperlipidemia     Hypertension     Vitamin D deficiency          Past Surgical History:   Procedure Laterality Date    SHOULDER SURGERY             Current Outpatient Medications:     allopurinol (ZYLOPRIM) 300 MG tablet, Take 1 tablet by mouth Daily., Disp: , Rfl:     losartan (COZAAR) 100 MG tablet, Take 1 tablet by mouth Daily., Disp: , Rfl:     simvastatin (ZOCOR) 20 MG tablet, Take 1 tablet by mouth Every Night., Disp: 90 tablet, Rfl: 3    vitamin D (ERGOCALCIFEROL) 1.25 MG (00037 UT) capsule capsule, Take 1 capsule  "by mouth 1 (One) Time Per Week., Disp: , Rfl:     hydroCHLOROthiazide 25 MG tablet, Take 1 tablet by mouth Daily., Disp: 90 tablet, Rfl: 3      Social History     Socioeconomic History    Marital status:    Tobacco Use    Smoking status: Never     Passive exposure: Never    Smokeless tobacco: Never   Vaping Use    Vaping status: Never Used   Substance and Sexual Activity    Alcohol use: Yes     Alcohol/week: 35.0 standard drinks of alcohol     Types: 35 Cans of beer per week    Drug use: Never    Sexual activity: Yes     Partners: Female         Review of Systems   Constitutional: Negative for chills and fever.   HENT:  Negative for ear discharge and nosebleeds.    Eyes:  Negative for discharge and redness.   Cardiovascular:  Positive for chest pain. Negative for orthopnea, palpitations, paroxysmal nocturnal dyspnea and syncope.   Respiratory:  Positive for shortness of breath. Negative for cough and wheezing.    Endocrine: Negative for heat intolerance.   Skin:  Negative for rash.   Musculoskeletal:  Negative for arthritis and myalgias.   Gastrointestinal:  Negative for abdominal pain, melena, nausea and vomiting.   Genitourinary:  Negative for dysuria and hematuria.   Neurological:  Negative for dizziness, light-headedness, numbness and tremors.   Psychiatric/Behavioral:  Negative for depression. The patient is not nervous/anxious.        Procedures      ECG 12 Lead    Date/Time: 5/7/2025 12:49 PM  Performed by: Teja Valdes MD    Authorized by: Teja Valdes MD  Previous ECG: no previous ECG available  Rhythm: sinus rhythm    Clinical impression: normal ECG          ECG 12 Lead    (Results Pending)           Objective:    /95 (BP Location: Right arm, Patient Position: Sitting, Cuff Size: Large Adult)   Pulse 66   Resp 18   Ht 171 cm (67.32\")   Wt 80.7 kg (178 lb)   SpO2 98%   BMI 27.61 kg/m²         Constitutional:       Appearance: Well-developed.   Eyes:      General: No scleral icterus.   "      Right eye: No discharge.   HENT:      Head: Normocephalic and atraumatic.   Neck:      Thyroid: No thyromegaly.      Lymphadenopathy: No cervical adenopathy.   Pulmonary:      Effort: Pulmonary effort is normal. No respiratory distress.      Breath sounds: Normal breath sounds. No wheezing. No rales.   Cardiovascular:      Normal rate. Regular rhythm.      No gallop.    Edema:     Peripheral edema absent.   Abdominal:      Tenderness: There is no abdominal tenderness.   Skin:     Findings: No erythema or rash.   Neurological:      Mental Status: Alert and oriented to person, place, and time.             Assessment:       Diagnosis Plan   1. Primary hypertension  ECG 12 Lead    Complete PFT - Pre & Post Bronchodilator    CT Cardiac Calcium Score Without Dye    hydroCHLOROthiazide 25 MG tablet    Adult Transthoracic Echo Complete W/ Cont if Necessary Per Protocol      2. Mixed hyperlipidemia  ECG 12 Lead    Complete PFT - Pre & Post Bronchodilator    CT Cardiac Calcium Score Without Dye    hydroCHLOROthiazide 25 MG tablet    Adult Transthoracic Echo Complete W/ Cont if Necessary Per Protocol      3. Chest pain, unspecified type  ECG 12 Lead    Complete PFT - Pre & Post Bronchodilator    CT Cardiac Calcium Score Without Dye    hydroCHLOROthiazide 25 MG tablet    Adult Transthoracic Echo Complete W/ Cont if Necessary Per Protocol      4. Shortness of breath  Complete PFT - Pre & Post Bronchodilator    CT Cardiac Calcium Score Without Dye    hydroCHLOROthiazide 25 MG tablet    Adult Transthoracic Echo Complete W/ Cont if Necessary Per Protocol               Plan:       MDM:    1.  Shortness of breath:    I would recommend to proceed with PFT.  CT calcium scores would be done.  I would proceed with echocardiogram    2.  Chest pain:    Previous stress test is negative I would consider CT calcium scores if it is abnormal further recommendation would be given    3.  Hypertension:    Start hydrochlorothiazide    4.   Mixed hyperlipidemia:    Patient is recently started on simvastatin hydrochlorothiazide

## 2025-05-14 ENCOUNTER — TELEPHONE (OUTPATIENT)
Dept: FAMILY MEDICINE CLINIC | Facility: CLINIC | Age: 60
End: 2025-05-14
Payer: COMMERCIAL

## 2025-05-14 NOTE — TELEPHONE ENCOUNTER
Caller: Dusty Walker    Relationship: Self    Best call back number:     What medication are you requesting:     vitamin D (ERGOCALCIFEROL) 1.25 MG (52817 UT) capsule capsule  50,000 Units, Weekly            Summary: Take 1 capsule by mouth 1 (One) Time Per Week.          Have you had these symptoms before:    [x] Yes  [] No    Have you been treated for these symptoms before:   [x] Yes  [] No    If a prescription is needed, what is your preferred pharmacy and phone number: [id number] - [place], [place] - [address] [phone] [facility] [phone]      Additional notes:

## 2025-05-15 RX ORDER — ERGOCALCIFEROL 1.25 MG/1
50000 CAPSULE, LIQUID FILLED ORAL WEEKLY
Qty: 5 CAPSULE | Refills: 0 | Status: SHIPPED | OUTPATIENT
Start: 2025-05-15

## 2025-05-23 ENCOUNTER — HOSPITAL ENCOUNTER (OUTPATIENT)
Dept: RESPIRATORY THERAPY | Facility: HOSPITAL | Age: 60
Discharge: HOME | End: 2025-05-23
Payer: COMMERCIAL

## 2025-05-23 VITALS — RESPIRATION RATE: 15 BRPM | OXYGEN SATURATION: 98 % | HEART RATE: 75 BPM

## 2025-05-23 DIAGNOSIS — R06.02 SHORTNESS OF BREATH: ICD-10-CM

## 2025-05-23 DIAGNOSIS — R07.9 CHEST PAIN, UNSPECIFIED TYPE: ICD-10-CM

## 2025-05-23 DIAGNOSIS — I10 PRIMARY HYPERTENSION: ICD-10-CM

## 2025-05-23 DIAGNOSIS — E78.2 MIXED HYPERLIPIDEMIA: ICD-10-CM

## 2025-05-23 PROCEDURE — 94664 DEMO&/EVAL PT USE INHALER: CPT

## 2025-05-23 PROCEDURE — 94726 PLETHYSMOGRAPHY LUNG VOLUMES: CPT

## 2025-05-23 PROCEDURE — 94060 EVALUATION OF WHEEZING: CPT

## 2025-05-23 PROCEDURE — 94729 DIFFUSING CAPACITY: CPT

## 2025-05-23 PROCEDURE — 94799 UNLISTED PULMONARY SVC/PX: CPT

## 2025-05-23 RX ORDER — ALBUTEROL SULFATE 90 UG/1
2 INHALANT RESPIRATORY (INHALATION) ONCE
Status: COMPLETED | OUTPATIENT
Start: 2025-05-23 | End: 2025-05-23

## 2025-05-23 RX ADMIN — ALBUTEROL SULFATE 2 PUFF: 90 AEROSOL, METERED RESPIRATORY (INHALATION) at 06:43

## 2025-06-20 ENCOUNTER — HOSPITAL ENCOUNTER (OUTPATIENT)
Dept: CT IMAGING | Facility: HOSPITAL | Age: 60
Discharge: HOME | End: 2025-06-20
Admitting: INTERNAL MEDICINE

## 2025-06-20 ENCOUNTER — HOSPITAL ENCOUNTER (OUTPATIENT)
Dept: CARDIOLOGY | Facility: HOSPITAL | Age: 60
Discharge: HOME | End: 2025-06-20
Admitting: INTERNAL MEDICINE
Payer: COMMERCIAL

## 2025-06-20 VITALS — SYSTOLIC BLOOD PRESSURE: 142 MMHG | DIASTOLIC BLOOD PRESSURE: 64 MMHG

## 2025-06-20 DIAGNOSIS — R06.02 SHORTNESS OF BREATH: ICD-10-CM

## 2025-06-20 DIAGNOSIS — E78.2 MIXED HYPERLIPIDEMIA: ICD-10-CM

## 2025-06-20 DIAGNOSIS — I10 PRIMARY HYPERTENSION: ICD-10-CM

## 2025-06-20 DIAGNOSIS — R07.9 CHEST PAIN, UNSPECIFIED TYPE: ICD-10-CM

## 2025-06-20 LAB
AORTIC DIMENSIONLESS INDEX: 0.71 (DI)
AV MEAN PRESS GRAD SYS DOP V1V2: 4.8 MMHG
AV VMAX SYS DOP: 151.1 CM/SEC
BH CV ECHO LEFT VENTRICLE GLOBAL LONGITUDINAL STRAIN: -20.3 %
BH CV ECHO MEAS - ACS: 1.94 CM
BH CV ECHO MEAS - AO MAX PG: 9.1 MMHG
BH CV ECHO MEAS - AO V2 VTI: 33.1 CM
BH CV ECHO MEAS - AVA(I,D): 2.45 CM2
BH CV ECHO MEAS - EDV(CUBED): 87.1 ML
BH CV ECHO MEAS - EDV(MOD-SP4): 110 ML
BH CV ECHO MEAS - EF(MOD-SP4): 52.6 %
BH CV ECHO MEAS - ESV(CUBED): 29.1 ML
BH CV ECHO MEAS - ESV(MOD-SP4): 52.1 ML
BH CV ECHO MEAS - FS: 30.6 %
BH CV ECHO MEAS - IVS/LVPW: 1.03 CM
BH CV ECHO MEAS - IVSD: 0.99 CM
BH CV ECHO MEAS - LA DIMENSION: 3.4 CM
BH CV ECHO MEAS - LAT PEAK E' VEL: 13.5 CM/SEC
BH CV ECHO MEAS - LV DIASTOLIC VOL/BSA (35-75): 57.2 CM2
BH CV ECHO MEAS - LV MASS(C)D: 144 GRAMS
BH CV ECHO MEAS - LV MAX PG: 4.5 MMHG
BH CV ECHO MEAS - LV MEAN PG: 2.34 MMHG
BH CV ECHO MEAS - LV SYSTOLIC VOL/BSA (12-30): 27.1 CM2
BH CV ECHO MEAS - LV V1 MAX: 106.4 CM/SEC
BH CV ECHO MEAS - LV V1 VTI: 23.4 CM
BH CV ECHO MEAS - LVIDD: 4.4 CM
BH CV ECHO MEAS - LVIDS: 3.1 CM
BH CV ECHO MEAS - LVOT AREA: 3.5 CM2
BH CV ECHO MEAS - LVOT DIAM: 2.1 CM
BH CV ECHO MEAS - LVPWD: 0.96 CM
BH CV ECHO MEAS - MED PEAK E' VEL: 9.1 CM/SEC
BH CV ECHO MEAS - MR MAX PG: 24.3 MMHG
BH CV ECHO MEAS - MR MAX VEL: 246.7 CM/SEC
BH CV ECHO MEAS - MV A MAX VEL: 79.7 CM/SEC
BH CV ECHO MEAS - MV DEC SLOPE: 519.2 CM/SEC2
BH CV ECHO MEAS - MV DEC TIME: 0.24 SEC
BH CV ECHO MEAS - MV E MAX VEL: 80.2 CM/SEC
BH CV ECHO MEAS - MV E/A: 1.01
BH CV ECHO MEAS - MV MAX PG: 3.9 MMHG
BH CV ECHO MEAS - MV MEAN PG: 2.18 MMHG
BH CV ECHO MEAS - MV P1/2T: 59.5 MSEC
BH CV ECHO MEAS - MV V2 VTI: 27.8 CM
BH CV ECHO MEAS - MVA(P1/2T): 3.7 CM2
BH CV ECHO MEAS - MVA(VTI): 2.9 CM2
BH CV ECHO MEAS - PA ACC TIME: 0.15 SEC
BH CV ECHO MEAS - PA V2 MAX: 106.9 CM/SEC
BH CV ECHO MEAS - PULM A REVS DUR: 0.09 SEC
BH CV ECHO MEAS - PULM A REVS VEL: 28.6 CM/SEC
BH CV ECHO MEAS - PULM DIAS VEL: 47.9 CM/SEC
BH CV ECHO MEAS - PULM S/D: 1.34
BH CV ECHO MEAS - PULM SYS VEL: 64.2 CM/SEC
BH CV ECHO MEAS - RV MAX PG: 1.17 MMHG
BH CV ECHO MEAS - RV V1 MAX: 54 CM/SEC
BH CV ECHO MEAS - RV V1 VTI: 15.4 CM
BH CV ECHO MEAS - RVDD: 2.8 CM
BH CV ECHO MEAS - SV(LVOT): 80.9 ML
BH CV ECHO MEAS - SV(MOD-SP4): 57.9 ML
BH CV ECHO MEAS - SVI(LVOT): 42 ML/M2
BH CV ECHO MEAS - SVI(MOD-SP4): 30.1 ML/M2
BH CV ECHO MEAS - TAPSE (>1.6): 2.3 CM
BH CV ECHO MEAS - TR MAX PG: 14.6 MMHG
BH CV ECHO MEAS - TR MAX VEL: 191.1 CM/SEC
BH CV ECHO MEASUREMENTS AVERAGE E/E' RATIO: 7.1
BH CV XLRA - TDI S': 11.1 CM/SEC
LV EF 3D SEGMENTATION: 58 %
LV EF BIPLANE MOD: 53 %
SINUS: 2.6 CM
STJ: 2.11 CM

## 2025-06-20 PROCEDURE — 93356 MYOCRD STRAIN IMG SPCKL TRCK: CPT

## 2025-06-20 PROCEDURE — 93306 TTE W/DOPPLER COMPLETE: CPT

## 2025-06-20 PROCEDURE — 75571 CT HRT W/O DYE W/CA TEST: CPT

## 2025-06-23 RX ORDER — ERGOCALCIFEROL 1.25 MG/1
50000 CAPSULE, LIQUID FILLED ORAL WEEKLY
Qty: 5 CAPSULE | Refills: 0 | Status: SHIPPED | OUTPATIENT
Start: 2025-06-23

## 2025-06-23 NOTE — TELEPHONE ENCOUNTER
Caller: Dusty Walker    Relationship: Self    Best call back number:     [phone] (Mobile)       Requested Prescriptions:   Requested Prescriptions     Pending Prescriptions Disp Refills    vitamin D (ERGOCALCIFEROL) 1.25 MG (33321 UT) capsule capsule 5 capsule 0     Sig: Take 1 capsule by mouth 1 (One) Time Per Week.        Pharmacy where request should be sent: [facility] [address] [place], [place] [phone] [place], [place] [phone] FX     Last office visit with prescribing clinician: 5/7/2025   Last telemedicine visit with prescribing clinician: Visit date not found   Next office visit with prescribing clinician: 8/11/2025     Additional details provided by patient: PATIENT NEEDS THIS SENT OVER TO [facility] PLEASE THANK YOU     Does the patient have less than a 3 day supply:  [x] Yes  [] No    Would you like a call back once the refill request has been completed: [] Yes [x] No    If the office needs to give you a call back, can they leave a voicemail: [] Yes [x] No    Imelda Fonseca Rep   06/23/25 12:35 EDT

## 2025-06-27 ENCOUNTER — TELEPHONE (OUTPATIENT)
Dept: CARDIOLOGY | Facility: CLINIC | Age: 60
End: 2025-06-27
Payer: COMMERCIAL

## 2025-06-27 RX ORDER — ROSUVASTATIN CALCIUM 20 MG/1
20 TABLET, COATED ORAL DAILY
Qty: 30 TABLET | Refills: 11 | Status: SHIPPED | OUTPATIENT
Start: 2025-06-27

## 2025-06-27 NOTE — TELEPHONE ENCOUNTER
Attempted to call patient, left voicemail per verbal release.    Your calcium score is elevated.  Dr. Valdes recommends stopping simvastatin and starting rosuvastatin 20 mg.    This message was given through the  service.

## 2025-07-31 RX ORDER — ERGOCALCIFEROL 1.25 MG/1
50000 CAPSULE, LIQUID FILLED ORAL WEEKLY
Qty: 5 CAPSULE | Refills: 0 | Status: SHIPPED | OUTPATIENT
Start: 2025-07-31

## 2025-07-31 NOTE — TELEPHONE ENCOUNTER
Caller: Dusty Walker    Relationship: Self    Best call back number:    Telephone Information:   Mobile [phone]        Requested Prescriptions:   Requested Prescriptions     Pending Prescriptions Disp Refills    vitamin D (ERGOCALCIFEROL) 1.25 MG (74955 UT) capsule capsule 5 capsule 0     Sig: Take 1 capsule by mouth 1 (One) Time Per Week.        Pharmacy where request should be sent: [facility] [phone] [place], [place] [phone]     Last office visit with prescribing clinician: 5/7/2025   Last telemedicine visit with prescribing clinician: Visit date not found   Next office visit with prescribing clinician: 8/11/2025        Does the patient have less than a 3 day supply:  [x] Yes  [] No         Imelda Mayers Rep   07/31/25 11:19 EDT

## 2025-08-01 RX ORDER — ERGOCALCIFEROL 1.25 MG/1
50000 CAPSULE, LIQUID FILLED ORAL WEEKLY
Qty: 5 CAPSULE | Refills: 0 | OUTPATIENT
Start: 2025-08-01

## 2025-08-11 ENCOUNTER — OFFICE VISIT (OUTPATIENT)
Dept: FAMILY MEDICINE CLINIC | Facility: CLINIC | Age: 60
End: 2025-08-11
Payer: COMMERCIAL

## 2025-08-11 VITALS
SYSTOLIC BLOOD PRESSURE: 140 MMHG | HEIGHT: 67 IN | WEIGHT: 181.25 LBS | BODY MASS INDEX: 28.45 KG/M2 | TEMPERATURE: 98.6 F | OXYGEN SATURATION: 97 % | RESPIRATION RATE: 18 BRPM | HEART RATE: 82 BPM | DIASTOLIC BLOOD PRESSURE: 88 MMHG

## 2025-08-11 DIAGNOSIS — E78.2 MIXED HYPERLIPIDEMIA: ICD-10-CM

## 2025-08-11 DIAGNOSIS — I10 PRIMARY HYPERTENSION: Primary | ICD-10-CM

## 2025-08-11 DIAGNOSIS — E55.9 VITAMIN D DEFICIENCY: ICD-10-CM

## 2025-08-11 PROCEDURE — 99214 OFFICE O/P EST MOD 30 MIN: CPT

## 2025-08-11 RX ORDER — AMLODIPINE BESYLATE 2.5 MG/1
2.5 TABLET ORAL DAILY
Qty: 90 TABLET | Refills: 3 | Status: SHIPPED | OUTPATIENT
Start: 2025-08-11

## 2025-08-11 RX ORDER — SILODOSIN 8 MG/1
8 CAPSULE ORAL
COMMUNITY

## 2025-08-11 RX ORDER — LOSARTAN POTASSIUM AND HYDROCHLOROTHIAZIDE 25; 100 MG/1; MG/1
1 TABLET ORAL DAILY
Qty: 90 TABLET | Refills: 3 | Status: SHIPPED | OUTPATIENT
Start: 2025-08-11

## 2025-08-11 RX ORDER — LOSARTAN POTASSIUM AND HYDROCHLOROTHIAZIDE 25; 100 MG/1; MG/1
1 TABLET ORAL DAILY
COMMUNITY
Start: 2025-07-28 | End: 2025-08-11 | Stop reason: SDUPTHER

## 2025-08-12 LAB
25(OH)D3+25(OH)D2 SERPL-MCNC: 45.4 NG/ML (ref 30–100)
ALBUMIN SERPL-MCNC: 4.3 G/DL (ref 3.8–4.9)
ALP SERPL-CCNC: 87 IU/L (ref 44–121)
ALT SERPL-CCNC: 57 IU/L (ref 0–44)
AST SERPL-CCNC: 49 IU/L (ref 0–40)
BILIRUB SERPL-MCNC: 0.5 MG/DL (ref 0–1.2)
BUN SERPL-MCNC: 12 MG/DL (ref 6–24)
BUN/CREAT SERPL: 20 (ref 9–20)
CALCIUM SERPL-MCNC: 8.8 MG/DL (ref 8.7–10.2)
CHLORIDE SERPL-SCNC: 105 MMOL/L (ref 96–106)
CHOLEST SERPL-MCNC: 149 MG/DL (ref 100–199)
CO2 SERPL-SCNC: 18 MMOL/L (ref 20–29)
CREAT SERPL-MCNC: 0.61 MG/DL (ref 0.76–1.27)
EGFRCR SERPLBLD CKD-EPI 2021: 111 ML/MIN/1.73
GLOBULIN SER CALC-MCNC: 2.1 G/DL (ref 1.5–4.5)
GLUCOSE SERPL-MCNC: 111 MG/DL (ref 70–99)
HDLC SERPL-MCNC: 38 MG/DL
LDLC SERPL CALC-MCNC: 65 MG/DL (ref 0–99)
POTASSIUM SERPL-SCNC: 4.5 MMOL/L (ref 3.5–5.2)
PROT SERPL-MCNC: 6.4 G/DL (ref 6–8.5)
SODIUM SERPL-SCNC: 137 MMOL/L (ref 134–144)
TRIGL SERPL-MCNC: 289 MG/DL (ref 0–149)
VLDLC SERPL CALC-MCNC: 46 MG/DL (ref 5–40)